# Patient Record
Sex: FEMALE | Race: WHITE | NOT HISPANIC OR LATINO
[De-identification: names, ages, dates, MRNs, and addresses within clinical notes are randomized per-mention and may not be internally consistent; named-entity substitution may affect disease eponyms.]

---
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
## 2023-01-03 ENCOUNTER — APPOINTMENT (OUTPATIENT)
Dept: PEDIATRIC ALLERGY IMMUNOLOGY | Facility: CLINIC | Age: 18
End: 2023-01-03
Payer: COMMERCIAL

## 2023-01-03 VITALS
DIASTOLIC BLOOD PRESSURE: 75 MMHG | SYSTOLIC BLOOD PRESSURE: 122 MMHG | RESPIRATION RATE: 16 BRPM | HEART RATE: 71 BPM | TEMPERATURE: 98.3 F | OXYGEN SATURATION: 96 %

## 2023-01-03 PROCEDURE — 99072 ADDL SUPL MATRL&STAF TM PHE: CPT

## 2023-01-03 PROCEDURE — 96401 CHEMO ANTI-NEOPL SQ/IM: CPT | Mod: 76

## 2023-01-03 RX ORDER — OMALIZUMAB 150 MG/ML
150 INJECTION, SOLUTION SUBCUTANEOUS
Qty: 0 | Refills: 0 | Status: COMPLETED | OUTPATIENT
Start: 2023-01-03

## 2023-01-03 RX ADMIN — OMALIZUMAB 0 MG/ML: 150 INJECTION, SOLUTION SUBCUTANEOUS at 00:00

## 2023-01-31 ENCOUNTER — APPOINTMENT (OUTPATIENT)
Dept: PEDIATRIC ALLERGY IMMUNOLOGY | Facility: CLINIC | Age: 18
End: 2023-01-31
Payer: COMMERCIAL

## 2023-01-31 VITALS
DIASTOLIC BLOOD PRESSURE: 76 MMHG | SYSTOLIC BLOOD PRESSURE: 112 MMHG | TEMPERATURE: 98.3 F | OXYGEN SATURATION: 96 % | HEART RATE: 74 BPM | RESPIRATION RATE: 16 BRPM

## 2023-01-31 PROCEDURE — 96401 CHEMO ANTI-NEOPL SQ/IM: CPT | Mod: 76

## 2023-01-31 PROCEDURE — 99072 ADDL SUPL MATRL&STAF TM PHE: CPT

## 2023-01-31 RX ORDER — OMALIZUMAB 150 MG/ML
150 INJECTION, SOLUTION SUBCUTANEOUS
Qty: 0 | Refills: 0 | Status: COMPLETED | OUTPATIENT
Start: 2023-01-31

## 2023-01-31 RX ADMIN — OMALIZUMAB 0 MG/ML: 150 INJECTION, SOLUTION SUBCUTANEOUS at 00:00

## 2023-03-07 ENCOUNTER — APPOINTMENT (OUTPATIENT)
Dept: PEDIATRIC ALLERGY IMMUNOLOGY | Facility: CLINIC | Age: 18
End: 2023-03-07
Payer: COMMERCIAL

## 2023-03-07 VITALS
HEART RATE: 70 BPM | SYSTOLIC BLOOD PRESSURE: 125 MMHG | OXYGEN SATURATION: 97 % | DIASTOLIC BLOOD PRESSURE: 76 MMHG | RESPIRATION RATE: 16 BRPM

## 2023-03-07 PROCEDURE — 99072 ADDL SUPL MATRL&STAF TM PHE: CPT

## 2023-03-07 PROCEDURE — 96401 CHEMO ANTI-NEOPL SQ/IM: CPT | Mod: 76

## 2023-03-07 RX ORDER — OMALIZUMAB 150 MG/ML
150 INJECTION, SOLUTION SUBCUTANEOUS
Qty: 0 | Refills: 0 | Status: COMPLETED | OUTPATIENT
Start: 2023-03-07

## 2023-04-04 ENCOUNTER — APPOINTMENT (OUTPATIENT)
Dept: PEDIATRIC ALLERGY IMMUNOLOGY | Facility: CLINIC | Age: 18
End: 2023-04-04

## 2023-05-04 ENCOUNTER — APPOINTMENT (OUTPATIENT)
Dept: PEDIATRIC ALLERGY IMMUNOLOGY | Facility: CLINIC | Age: 18
End: 2023-05-04
Payer: COMMERCIAL

## 2023-05-04 VITALS
RESPIRATION RATE: 20 BRPM | HEIGHT: 65 IN | BODY MASS INDEX: 39.99 KG/M2 | OXYGEN SATURATION: 97 % | DIASTOLIC BLOOD PRESSURE: 69 MMHG | WEIGHT: 240 LBS | SYSTOLIC BLOOD PRESSURE: 110 MMHG | HEART RATE: 71 BPM

## 2023-05-04 PROCEDURE — 99214 OFFICE O/P EST MOD 30 MIN: CPT

## 2023-05-04 RX ORDER — PREDNISONE 10 MG/1
10 TABLET ORAL
Qty: 26 | Refills: 0 | Status: DISCONTINUED | COMMUNITY
Start: 2022-09-06 | End: 2023-05-04

## 2023-05-04 RX ORDER — CLARITHROMYCIN 500 MG/1
500 TABLET, FILM COATED ORAL
Qty: 20 | Refills: 0 | Status: DISCONTINUED | COMMUNITY
Start: 2022-09-19 | End: 2023-05-04

## 2023-05-04 RX ORDER — DIPHENHYDRAMINE HCL 12.5MG/5ML
12.5 LIQUID (ML) ORAL
Refills: 0 | Status: ACTIVE | COMMUNITY

## 2023-05-04 RX ORDER — METFORMIN HYDROCHLORIDE 500 MG/1
500 TABLET, COATED ORAL
Refills: 0 | Status: DISCONTINUED | COMMUNITY
End: 2023-05-04

## 2023-05-04 RX ORDER — OMALIZUMAB 150 MG/ML
150 INJECTION, SOLUTION SUBCUTANEOUS
Refills: 0 | Status: DISCONTINUED | COMMUNITY
End: 2023-05-04

## 2023-05-04 NOTE — REASON FOR VISIT
[Routine Follow-Up] : a routine follow-up visit for [Allergy Evaluation/ Skin Testing] : allergy evaluation and or skin testing [Asthma] : asthma [Hives] : hives [FreeTextEntry3] : with dogs

## 2023-05-04 NOTE — ASSESSMENT
[FreeTextEntry1] : 17 y.o with hx of CIU, AR, and Asthma presents with hives after exposure to dog saliva\par \par Discussed with patient efficacy of IT with dog for purpose of contact urticaria - ?? helpful - \par \par Suggest:\par -At this point we will hold off on shots and continue Allegra 180mg 2 tab QD with 3rd tab as needed\par -Continue Xolair 300mg q4 weeks\par - Continue Albuterol 2 puffs q4-6hrs PRN\par - Avoid PN, pecan,walnut and lentil and Keep epinephrine Autoinjector on hand\par \par Total MD time spent on this encounter was 35 minutes.  This includes time devoted to preparing to see the patient with review of previous medical record, obtaining medical history, performing physical exam, counseling and patient education with patient and family, ordering medications and lab studies, documentation in the medical record and coordination of care.\par \par \par

## 2023-05-04 NOTE — SOCIAL HISTORY
[House] : [unfilled] lives in a house  [Radiator/Baseboard] : heating provided by radiator(s)/baseboard(s) [Window Units] : air conditioning provided by window units [None] : none [Single] : single [Mother] : mother [Father] : father [Sister] : sister [College] : College [Humidifier] : does not use a humidifier [Dehumidifier] : does not use a dehumidifier [Dust Mite Covers] : does not have dust mite covers [Feather Pillows] : does not have feather pillows [Feather Comforter] : does not have a feather comforter [Bedroom] : not in the bedroom [Living Area] : not in the living area [Smokers in Household] : there are no smokers in the home [de-identified] : music [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

## 2023-05-04 NOTE — HISTORY OF PRESENT ILLNESS
[de-identified] : 17 yr old with 3 year history of cold urticaria on Xolair 300 mg SQ now q 6 weeks. AFter about 3 years of Xolair pt fees hives have improved about 40-50% and pt had her Xolair tapered to q 6 weeks in 1/22 and feels she is doing better with cold urticaria. She tolerated cold air and cold water much better and has no hives at other times. Her insurance makes Xolair coverage difficult and she is received product free of charge directly from [employer]. She continues her Allegra 180 mg qd and is happy with how she is doing. \par \par She has underlying peanut and lentil reactions but has also been avoiding all tree nuts for ?? reasons. \par \par Her last set of Immunocaps 7/26/22\par PN 3.29 down from 8.90 with riddhi h2 2.19 down from 5.55-Avoid\par [place], brazil nut, cashew, coconut, hazelnut macadamia, pine nut, green bean, pea, red kidney bean, soy, whitebean -still negative- ok to try at home\par Pecan and walnut down but still too large so must avoid\par Pistachio- 0.39 down from 0.68-ok to try at home\par Lentil not done-avoid\par \par She would like to eat TN but would like ST first before doing at home. She will continue to avoid walnut and pecan and PN secondary to large Immunocaps noted above but would like to eat other TN. She is OK with white beans, black beans, chickpeas but would like to consider eating soy and peas. \par \par Patient now returns 5/23 - interested in getting AIT for the hives she gets with exposure to dog saliva. 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 When dog lick her she gets hives that are itchy and last about 2 hours. She typically pre-treats prior to babysitting with 2 Allegra 180mg but hives still occur. The use of Allegra does help prevent any nasal or asthma symptoms with dog exposure. She would like to use AIT to prevent hives with dog exposure. Patient on AIT from 1/2013-2/2015 and was inconsistent with large local reactions and two systemic reactions.\par \par Overall her hives have been controlled with use of Xolair 300mg q4 weeks and Allegra PRN. Her allergies have also been well controlled with no symptoms this spring. Her asthma has also been well controlled with minimal use of albuterol and no further oral steroid use. \par \par Since last visit pt has been able to introduce almonds, hazelnut, coconut, and pistachio. She continues to avoid PN, pecan,walnut and lentil and carries EpiPen.\par \par Pt had previously been on IT years ago and was non complaints and had significant problems with moderate local reaction and 1-2 systemic reactions IT was stopped\par

## 2023-05-08 ENCOUNTER — APPOINTMENT (OUTPATIENT)
Dept: PEDIATRIC ALLERGY IMMUNOLOGY | Facility: CLINIC | Age: 18
End: 2023-05-08

## 2023-06-29 ENCOUNTER — APPOINTMENT (OUTPATIENT)
Dept: PEDIATRIC ALLERGY IMMUNOLOGY | Facility: CLINIC | Age: 18
End: 2023-06-29
Payer: COMMERCIAL

## 2023-06-29 VITALS
SYSTOLIC BLOOD PRESSURE: 116 MMHG | OXYGEN SATURATION: 98 % | DIASTOLIC BLOOD PRESSURE: 72 MMHG | HEART RATE: 68 BPM | RESPIRATION RATE: 20 BRPM

## 2023-06-29 PROCEDURE — 96401 CHEMO ANTI-NEOPL SQ/IM: CPT | Mod: 76

## 2023-06-29 RX ORDER — OMALIZUMAB 150 MG/ML
150 INJECTION, SOLUTION SUBCUTANEOUS
Qty: 0 | Refills: 0 | Status: COMPLETED | OUTPATIENT
Start: 2023-06-29

## 2023-06-29 RX ORDER — EPINEPHRINE 0.3 MG/.3ML
0.3 INJECTION INTRAMUSCULAR
Qty: 2 | Refills: 1 | Status: ACTIVE | COMMUNITY
Start: 1900-01-01 | End: 1900-01-01

## 2023-06-29 RX ORDER — ALBUTEROL SULFATE 90 UG/1
108 (90 BASE) INHALANT RESPIRATORY (INHALATION)
Qty: 1 | Refills: 1 | Status: ACTIVE | COMMUNITY
Start: 2022-09-06 | End: 1900-01-01

## 2023-06-29 RX ADMIN — OMALIZUMAB 0 MG/ML: 150 INJECTION, SOLUTION SUBCUTANEOUS at 00:00

## 2023-07-27 ENCOUNTER — NON-APPOINTMENT (OUTPATIENT)
Age: 18
End: 2023-07-27

## 2023-08-10 ENCOUNTER — APPOINTMENT (OUTPATIENT)
Dept: PEDIATRIC ALLERGY IMMUNOLOGY | Facility: CLINIC | Age: 18
End: 2023-08-10
Payer: COMMERCIAL

## 2023-08-10 VITALS
RESPIRATION RATE: 24 BRPM | HEART RATE: 70 BPM | SYSTOLIC BLOOD PRESSURE: 110 MMHG | OXYGEN SATURATION: 100 % | DIASTOLIC BLOOD PRESSURE: 72 MMHG

## 2023-08-10 PROCEDURE — 96401 CHEMO ANTI-NEOPL SQ/IM: CPT | Mod: 76

## 2023-08-10 RX ORDER — OMALIZUMAB 150 MG/ML
150 INJECTION, SOLUTION SUBCUTANEOUS
Qty: 0 | Refills: 0 | Status: COMPLETED | OUTPATIENT
Start: 2023-08-10

## 2023-09-12 ENCOUNTER — APPOINTMENT (OUTPATIENT)
Dept: PEDIATRIC ALLERGY IMMUNOLOGY | Facility: CLINIC | Age: 18
End: 2023-09-12
Payer: COMMERCIAL

## 2023-09-12 PROCEDURE — 96401U: CUSTOM | Mod: NC

## 2023-09-12 PROCEDURE — 96372 THER/PROPH/DIAG INJ SC/IM: CPT | Mod: NC

## 2023-09-12 RX ORDER — OMALIZUMAB 150 MG/ML
150 INJECTION, SOLUTION SUBCUTANEOUS
Qty: 0 | Refills: 0 | Status: COMPLETED | OUTPATIENT
Start: 2023-09-12

## 2023-09-26 ENCOUNTER — RX RENEWAL (OUTPATIENT)
Age: 18
End: 2023-09-26

## 2023-09-26 RX ORDER — OMALIZUMAB 150 MG/ML
150 INJECTION, SOLUTION SUBCUTANEOUS
Qty: 300 | Refills: 6 | Status: DISCONTINUED | COMMUNITY
Start: 2022-11-29 | End: 2023-09-26

## 2023-10-10 ENCOUNTER — APPOINTMENT (OUTPATIENT)
Dept: PEDIATRIC ALLERGY IMMUNOLOGY | Facility: CLINIC | Age: 18
End: 2023-10-10

## 2023-11-21 ENCOUNTER — APPOINTMENT (OUTPATIENT)
Dept: PEDIATRIC ALLERGY IMMUNOLOGY | Facility: CLINIC | Age: 18
End: 2023-11-21
Payer: COMMERCIAL

## 2023-11-21 VITALS
BODY MASS INDEX: 40.82 KG/M2 | DIASTOLIC BLOOD PRESSURE: 82 MMHG | RESPIRATION RATE: 20 BRPM | OXYGEN SATURATION: 97 % | WEIGHT: 245 LBS | SYSTOLIC BLOOD PRESSURE: 121 MMHG | HEIGHT: 65 IN | HEART RATE: 67 BPM

## 2023-11-21 DIAGNOSIS — J30.9 ALLERGIC RHINITIS, UNSPECIFIED: ICD-10-CM

## 2023-11-21 DIAGNOSIS — J45.20 MILD INTERMITTENT ASTHMA, UNCOMPLICATED: ICD-10-CM

## 2023-11-21 DIAGNOSIS — Z91.018 ALLERGY TO OTHER FOODS: ICD-10-CM

## 2023-11-21 DIAGNOSIS — Z91.010 ALLERGY TO PEANUTS: ICD-10-CM

## 2023-11-21 PROCEDURE — 99214 OFFICE O/P EST MOD 30 MIN: CPT | Mod: 25

## 2023-11-21 PROCEDURE — 96372 THER/PROPH/DIAG INJ SC/IM: CPT

## 2023-11-21 RX ORDER — PREDNISONE 20 MG/1
20 TABLET ORAL DAILY
Qty: 20 | Refills: 0 | Status: DISCONTINUED | COMMUNITY
Start: 2023-07-27 | End: 2023-11-21

## 2023-11-21 RX ORDER — CETIRIZINE HYDROCHLORIDE 10 MG/1
10 TABLET, FILM COATED ORAL
Refills: 0 | Status: ACTIVE | COMMUNITY

## 2023-11-21 RX ORDER — OMALIZUMAB 150 MG/ML
150 INJECTION, SOLUTION SUBCUTANEOUS
Qty: 0 | Refills: 0 | Status: COMPLETED | OUTPATIENT
Start: 2023-11-21

## 2024-01-02 ENCOUNTER — APPOINTMENT (OUTPATIENT)
Dept: PEDIATRIC ALLERGY IMMUNOLOGY | Facility: CLINIC | Age: 19
End: 2024-01-02
Payer: COMMERCIAL

## 2024-01-02 VITALS
SYSTOLIC BLOOD PRESSURE: 114 MMHG | HEART RATE: 73 BPM | RESPIRATION RATE: 18 BRPM | DIASTOLIC BLOOD PRESSURE: 74 MMHG | OXYGEN SATURATION: 97 %

## 2024-01-02 PROCEDURE — 96401 CHEMO ANTI-NEOPL SQ/IM: CPT | Mod: 76

## 2024-01-02 RX ORDER — OMALIZUMAB 150 MG/ML
150 INJECTION, SOLUTION SUBCUTANEOUS
Qty: 0 | Refills: 0 | Status: COMPLETED | OUTPATIENT
Start: 2024-01-02

## 2024-01-03 RX ORDER — OMALIZUMAB 150 MG/ML
150 INJECTION, SOLUTION SUBCUTANEOUS
Qty: 2 | Refills: 7 | Status: ACTIVE | COMMUNITY
Start: 1900-01-01 | End: 1900-01-01

## 2024-01-10 ENCOUNTER — NON-APPOINTMENT (OUTPATIENT)
Age: 19
End: 2024-01-10

## 2024-02-15 ENCOUNTER — APPOINTMENT (OUTPATIENT)
Dept: PEDIATRIC ALLERGY IMMUNOLOGY | Facility: CLINIC | Age: 19
End: 2024-02-15
Payer: COMMERCIAL

## 2024-02-15 VITALS
HEART RATE: 71 BPM | DIASTOLIC BLOOD PRESSURE: 63 MMHG | SYSTOLIC BLOOD PRESSURE: 126 MMHG | OXYGEN SATURATION: 98 % | RESPIRATION RATE: 18 BRPM

## 2024-02-15 PROCEDURE — 96401 CHEMO ANTI-NEOPL SQ/IM: CPT | Mod: 76

## 2024-02-15 RX ORDER — OMALIZUMAB 150 MG/ML
150 INJECTION, SOLUTION SUBCUTANEOUS
Qty: 0 | Refills: 0 | Status: COMPLETED | OUTPATIENT
Start: 2024-02-15

## 2024-03-14 ENCOUNTER — APPOINTMENT (OUTPATIENT)
Dept: PEDIATRIC ALLERGY IMMUNOLOGY | Facility: CLINIC | Age: 19
End: 2024-03-14
Payer: COMMERCIAL

## 2024-03-14 PROCEDURE — 96401 CHEMO ANTI-NEOPL SQ/IM: CPT | Mod: 76

## 2024-03-14 RX ORDER — OMALIZUMAB 150 MG/ML
150 INJECTION, SOLUTION SUBCUTANEOUS
Qty: 0 | Refills: 0 | Status: COMPLETED | OUTPATIENT
Start: 2024-03-14

## 2024-03-19 ENCOUNTER — NON-APPOINTMENT (OUTPATIENT)
Age: 19
End: 2024-03-19

## 2024-04-18 RX ORDER — EPINEPHRINE 0.3 MG/.3ML
0.3 INJECTION INTRAMUSCULAR
Qty: 2 | Refills: 1 | Status: ACTIVE | COMMUNITY
Start: 2023-11-21 | End: 1900-01-01

## 2024-04-18 RX ORDER — ALBUTEROL SULFATE 90 UG/1
108 (90 BASE) INHALANT RESPIRATORY (INHALATION)
Qty: 1 | Refills: 3 | Status: ACTIVE | COMMUNITY
Start: 2023-11-21 | End: 1900-01-01

## 2024-04-23 ENCOUNTER — APPOINTMENT (OUTPATIENT)
Dept: PEDIATRIC ALLERGY IMMUNOLOGY | Facility: CLINIC | Age: 19
End: 2024-04-23
Payer: COMMERCIAL

## 2024-04-23 PROCEDURE — 96401 CHEMO ANTI-NEOPL SQ/IM: CPT | Mod: 76

## 2024-04-23 RX ORDER — OMALIZUMAB 150 MG/ML
150 INJECTION, SOLUTION SUBCUTANEOUS
Qty: 0 | Refills: 0 | Status: COMPLETED | OUTPATIENT
Start: 2024-04-23

## 2024-04-23 RX ADMIN — OMALIZUMAB 300 MG/ML: 150 INJECTION, SOLUTION SUBCUTANEOUS at 00:00

## 2024-05-02 ENCOUNTER — APPOINTMENT (OUTPATIENT)
Dept: PEDIATRIC ALLERGY IMMUNOLOGY | Facility: CLINIC | Age: 19
End: 2024-05-02

## 2024-05-21 ENCOUNTER — APPOINTMENT (OUTPATIENT)
Dept: PEDIATRIC ALLERGY IMMUNOLOGY | Facility: CLINIC | Age: 19
End: 2024-05-21
Payer: COMMERCIAL

## 2024-05-21 VITALS
OXYGEN SATURATION: 97 % | HEART RATE: 90 BPM | RESPIRATION RATE: 18 BRPM | DIASTOLIC BLOOD PRESSURE: 81 MMHG | SYSTOLIC BLOOD PRESSURE: 114 MMHG

## 2024-05-21 PROCEDURE — 96401 CHEMO ANTI-NEOPL SQ/IM: CPT | Mod: 76

## 2024-05-21 RX ORDER — OMALIZUMAB 150 MG/ML
150 INJECTION, SOLUTION SUBCUTANEOUS
Qty: 0 | Refills: 0 | Status: COMPLETED | OUTPATIENT
Start: 2024-05-21

## 2024-05-21 RX ADMIN — OMALIZUMAB 300 MG/ML: 150 INJECTION, SOLUTION SUBCUTANEOUS at 00:00

## 2024-06-27 ENCOUNTER — APPOINTMENT (OUTPATIENT)
Dept: PEDIATRIC ALLERGY IMMUNOLOGY | Facility: CLINIC | Age: 19
End: 2024-06-27
Payer: COMMERCIAL

## 2024-06-27 VITALS — HEART RATE: 73 BPM | SYSTOLIC BLOOD PRESSURE: 136 MMHG | OXYGEN SATURATION: 97 % | DIASTOLIC BLOOD PRESSURE: 80 MMHG

## 2024-06-27 DIAGNOSIS — L50.2 URTICARIA DUE TO COLD AND HEAT: ICD-10-CM

## 2024-06-27 PROCEDURE — 96401 CHEMO ANTI-NEOPL SQ/IM: CPT | Mod: 76

## 2024-08-01 ENCOUNTER — APPOINTMENT (OUTPATIENT)
Dept: PEDIATRIC ALLERGY IMMUNOLOGY | Facility: CLINIC | Age: 19
End: 2024-08-01
Payer: COMMERCIAL

## 2024-08-01 VITALS
RESPIRATION RATE: 20 BRPM | DIASTOLIC BLOOD PRESSURE: 79 MMHG | OXYGEN SATURATION: 96 % | HEART RATE: 71 BPM | SYSTOLIC BLOOD PRESSURE: 126 MMHG

## 2024-08-01 DIAGNOSIS — L50.2 URTICARIA DUE TO COLD AND HEAT: ICD-10-CM

## 2024-08-01 PROCEDURE — 96401 CHEMO ANTI-NEOPL SQ/IM: CPT | Mod: 76

## 2024-08-01 RX ORDER — OMALIZUMAB 150 MG/ML
150 INJECTION, SOLUTION SUBCUTANEOUS
Qty: 0 | Refills: 0 | Status: COMPLETED | OUTPATIENT
Start: 2024-08-01

## 2024-09-05 ENCOUNTER — APPOINTMENT (OUTPATIENT)
Dept: PEDIATRIC ALLERGY IMMUNOLOGY | Facility: CLINIC | Age: 19
End: 2024-09-05
Payer: COMMERCIAL

## 2024-09-05 ENCOUNTER — APPOINTMENT (OUTPATIENT)
Dept: PEDIATRIC ALLERGY IMMUNOLOGY | Facility: CLINIC | Age: 19
End: 2024-09-05

## 2024-09-05 VITALS
HEART RATE: 79 BPM | HEIGHT: 65 IN | SYSTOLIC BLOOD PRESSURE: 107 MMHG | WEIGHT: 248 LBS | BODY MASS INDEX: 41.32 KG/M2 | DIASTOLIC BLOOD PRESSURE: 71 MMHG | RESPIRATION RATE: 20 BRPM | OXYGEN SATURATION: 97 %

## 2024-09-05 DIAGNOSIS — L50.2 URTICARIA DUE TO COLD AND HEAT: ICD-10-CM

## 2024-09-05 DIAGNOSIS — L50.8 OTHER URTICARIA: ICD-10-CM

## 2024-09-05 DIAGNOSIS — J30.9 ALLERGIC RHINITIS, UNSPECIFIED: ICD-10-CM

## 2024-09-05 DIAGNOSIS — J45.20 MILD INTERMITTENT ASTHMA, UNCOMPLICATED: ICD-10-CM

## 2024-09-05 DIAGNOSIS — Z91.018 ALLERGY TO OTHER FOODS: ICD-10-CM

## 2024-09-05 DIAGNOSIS — Z91.010 ALLERGY TO PEANUTS: ICD-10-CM

## 2024-09-05 PROCEDURE — 96401 CHEMO ANTI-NEOPL SQ/IM: CPT | Mod: 76

## 2024-09-05 PROCEDURE — 99214 OFFICE O/P EST MOD 30 MIN: CPT | Mod: 25

## 2024-09-05 PROCEDURE — XXXXX: CPT | Mod: 1L

## 2024-09-05 RX ORDER — OMALIZUMAB 150 MG/ML
150 INJECTION, SOLUTION SUBCUTANEOUS
Qty: 0 | Refills: 0 | Status: COMPLETED | OUTPATIENT
Start: 2024-09-05

## 2024-09-05 NOTE — ASSESSMENT
[FreeTextEntry1] : 18 yr old with long history of cold induced urticaria and chronic idiopathic urticaria - now doing very well on Xolair - currently tapering dosages to q6-8 weeks to see how she does. Currently on no H1 blockers but at this time suggest starting Zyrtec 10 mg qd-bid as we head into colder weather of fall and winter  Will try for insurance authorization again  Will repeat PN and TN immunocaps Will continue to carry Epi Pen Discuss use of Xolair for food allergies and its benefit in PN allergy  Follow up six months  Total MD time spent on this encounter was 35 minutes.  This includes time devoted to preparing to see the patient with review of previous medical record, obtaining medical history, performing physical exam, counseling and patient education with patient and family, ordering medications and lab studies, documentation in the medical record and coordination of care.

## 2024-09-05 NOTE — REVIEW OF SYSTEMS
[Rhinorrhea] : rhinorrhea [Post Nasal Drip] : post nasal drip [Urticaria] : urticaria [Pruritus] : pruritus [Nl] : Respiratory

## 2024-09-05 NOTE — HISTORY OF PRESENT ILLNESS
[de-identified] : 18 yr old with 4 year history of cold urticaria on Xolair 300 mg SQ now q 6 weeks. After about 3 years of Xolair pt fees hives have improved about 80-90% - taking quite some time to go away. Pt now tolerated most exposure to cold is able to swim in heated pools and go in and out of warm showers.  This is likely related to her chronic Xolair use which has helped significantly and has bene life changing for her.  She also had urticaria that was not cold related before, chronic idiopathic urticaria and that is now gone as well  She has not needed her Zyrtec or Xyzal recently.  However she has had some insurance issues with coverage and we are tapering her Xolair to q 8 weeks as a temporary measure until authorization is obtained  Pt also has mild AR and mild intermittent asthma  -well controlled   She has underlying peanut and lentil reactions but has also been avoiding some tree nuts, - she is OK with almond, hazelnut, coconut and ?? pistachio - she avoids cashew, pecan and walnut secondary to previous large positive tests, She also avoids lentils all tree nuts for ?? reasons.   Her last set of Immunocaps 7/26/22 PN 3.29 down from 8.90 with riddhi h2 2.19 down from 5.55-Avoid [place], [place] nut, cashew, coconut, hazelnut macadamia, pine nut, green bean, pea, red kidney bean, soy, white bean -still negative- ok to try at home-  Pecan and walnut down but still too large so must avoid P[place]achio- 0.39 down from 0.68-ok to try at home Lentil not done-avoid   carries Epi Pen  she would like to re-check her numbers to PN  and TN Discuss use of her Xolair for reduction in PN allergy   Pt is a nursing aid and a nursing student

## 2024-09-05 NOTE — SOCIAL HISTORY
[House] : [unfilled] lives in a house  [Radiator/Baseboard] : heating provided by radiator(s)/baseboard(s) [Window Units] : air conditioning provided by window units [None] : none [Single] : single [Mother] : mother [Father] : father [Sister] : sister [College] : College [Humidifier] : does not use a humidifier [Dehumidifier] : does not use a dehumidifier [Dust Mite Covers] : does not have dust mite covers [Feather Pillows] : does not have feather pillows [Feather Comforter] : does not have a feather comforter [Bedroom] : not in the bedroom [Living Area] : not in the living area [Smokers in Household] : there are no smokers in the home [de-identified] : music [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

## 2024-10-31 ENCOUNTER — APPOINTMENT (OUTPATIENT)
Dept: PEDIATRIC ALLERGY IMMUNOLOGY | Facility: CLINIC | Age: 19
End: 2024-10-31

## 2024-11-26 ENCOUNTER — APPOINTMENT (OUTPATIENT)
Dept: PEDIATRIC ALLERGY IMMUNOLOGY | Facility: CLINIC | Age: 19
End: 2024-11-26
Payer: COMMERCIAL

## 2024-11-26 VITALS
OXYGEN SATURATION: 98 % | RESPIRATION RATE: 18 BRPM | HEART RATE: 68 BPM | DIASTOLIC BLOOD PRESSURE: 63 MMHG | SYSTOLIC BLOOD PRESSURE: 107 MMHG

## 2024-11-26 DIAGNOSIS — L50.2 URTICARIA DUE TO COLD AND HEAT: ICD-10-CM

## 2024-11-26 DIAGNOSIS — L50.8 OTHER URTICARIA: ICD-10-CM

## 2024-11-26 PROCEDURE — 96401 CHEMO ANTI-NEOPL SQ/IM: CPT | Mod: 76

## 2024-11-26 RX ORDER — OMALIZUMAB 300 MG/2ML
300 INJECTION, SOLUTION SUBCUTANEOUS
Qty: 0 | Refills: 0 | Status: COMPLETED | OUTPATIENT
Start: 2024-11-26

## 2024-11-26 RX ADMIN — OMALIZUMAB 300 MG/2ML: 300 INJECTION, SOLUTION SUBCUTANEOUS at 00:00

## 2024-11-27 RX ORDER — OMALIZUMAB 300 MG/2ML
300 INJECTION, SOLUTION SUBCUTANEOUS
Qty: 0 | Refills: 0 | Status: COMPLETED | OUTPATIENT
Start: 2024-11-26

## 2024-12-31 ENCOUNTER — APPOINTMENT (OUTPATIENT)
Dept: PEDIATRIC ALLERGY IMMUNOLOGY | Facility: CLINIC | Age: 19
End: 2024-12-31
Payer: COMMERCIAL

## 2024-12-31 VITALS
RESPIRATION RATE: 20 BRPM | OXYGEN SATURATION: 97 % | SYSTOLIC BLOOD PRESSURE: 101 MMHG | HEART RATE: 89 BPM | DIASTOLIC BLOOD PRESSURE: 70 MMHG

## 2024-12-31 DIAGNOSIS — L50.2 URTICARIA DUE TO COLD AND HEAT: ICD-10-CM

## 2024-12-31 PROCEDURE — 96401 CHEMO ANTI-NEOPL SQ/IM: CPT | Mod: 76

## 2024-12-31 PROCEDURE — XXXXX: CPT

## 2024-12-31 RX ORDER — OMALIZUMAB 300 MG/2ML
300 INJECTION, SOLUTION SUBCUTANEOUS
Qty: 0 | Refills: 0 | Status: COMPLETED | OUTPATIENT
Start: 2024-12-31

## 2024-12-31 RX ADMIN — OMALIZUMAB 300 MG/2ML: 300 INJECTION, SOLUTION SUBCUTANEOUS at 00:00

## 2025-01-28 ENCOUNTER — TRANSCRIPTION ENCOUNTER (OUTPATIENT)
Age: 20
End: 2025-01-28

## 2025-01-28 ENCOUNTER — APPOINTMENT (OUTPATIENT)
Dept: PEDIATRIC ALLERGY IMMUNOLOGY | Facility: CLINIC | Age: 20
End: 2025-01-28
Payer: COMMERCIAL

## 2025-01-28 VITALS
HEART RATE: 90 BPM | RESPIRATION RATE: 18 BRPM | SYSTOLIC BLOOD PRESSURE: 122 MMHG | OXYGEN SATURATION: 97 % | DIASTOLIC BLOOD PRESSURE: 75 MMHG

## 2025-01-28 DIAGNOSIS — L50.2 URTICARIA DUE TO COLD AND HEAT: ICD-10-CM

## 2025-01-28 PROCEDURE — XXXXX: CPT | Mod: 1L

## 2025-01-28 PROCEDURE — 96401 CHEMO ANTI-NEOPL SQ/IM: CPT

## 2025-01-28 RX ORDER — OMALIZUMAB 300 MG/2ML
300 INJECTION, SOLUTION SUBCUTANEOUS
Qty: 0 | Refills: 0 | Status: COMPLETED | OUTPATIENT
Start: 2025-01-28

## 2025-01-28 RX ADMIN — OMALIZUMAB 300 MG/2ML: 300 INJECTION, SOLUTION SUBCUTANEOUS at 00:00

## 2025-02-24 NOTE — ED PEDIATRIC NURSE NOTE - NS_NURSE_RECEIVING_PHYS_ED_ALL_ED_FT
Detail Level: Simple Additional Notes: This was a shave removal not a biopsy by shave Render Risk Assessment In Note?: no Dr. Casarez

## 2025-03-04 ENCOUNTER — APPOINTMENT (OUTPATIENT)
Dept: PEDIATRIC ALLERGY IMMUNOLOGY | Facility: CLINIC | Age: 20
End: 2025-03-04
Payer: COMMERCIAL

## 2025-03-04 VITALS
WEIGHT: 256 LBS | DIASTOLIC BLOOD PRESSURE: 84 MMHG | HEIGHT: 62 IN | SYSTOLIC BLOOD PRESSURE: 141 MMHG | BODY MASS INDEX: 47.11 KG/M2

## 2025-03-04 DIAGNOSIS — J45.20 MILD INTERMITTENT ASTHMA, UNCOMPLICATED: ICD-10-CM

## 2025-03-04 DIAGNOSIS — L50.8 OTHER URTICARIA: ICD-10-CM

## 2025-03-04 DIAGNOSIS — Z91.018 ALLERGY TO OTHER FOODS: ICD-10-CM

## 2025-03-04 DIAGNOSIS — J30.9 ALLERGIC RHINITIS, UNSPECIFIED: ICD-10-CM

## 2025-03-04 PROCEDURE — 96401 CHEMO ANTI-NEOPL SQ/IM: CPT

## 2025-03-04 PROCEDURE — 99214 OFFICE O/P EST MOD 30 MIN: CPT | Mod: 25

## 2025-03-04 RX ORDER — OMALIZUMAB 300 MG/2ML
300 INJECTION, SOLUTION SUBCUTANEOUS
Qty: 0 | Refills: 0 | Status: COMPLETED | OUTPATIENT
Start: 2025-03-04

## 2025-03-10 ENCOUNTER — APPOINTMENT (OUTPATIENT)
Dept: PEDIATRIC ALLERGY IMMUNOLOGY | Facility: CLINIC | Age: 20
End: 2025-03-10
Payer: COMMERCIAL

## 2025-03-10 DIAGNOSIS — L50.2 URTICARIA DUE TO COLD AND HEAT: ICD-10-CM

## 2025-03-10 PROCEDURE — 99211 OFF/OP EST MAY X REQ PHY/QHP: CPT

## 2025-03-30 ENCOUNTER — EMERGENCY (EMERGENCY)
Facility: HOSPITAL | Age: 20
LOS: 0 days | Discharge: HOME | End: 2025-03-30
Attending: EMERGENCY MEDICINE
Payer: COMMERCIAL

## 2025-03-30 ENCOUNTER — NON-APPOINTMENT (OUTPATIENT)
Age: 20
End: 2025-03-30

## 2025-03-30 VITALS
HEART RATE: 69 BPM | OXYGEN SATURATION: 100 % | TEMPERATURE: 98 F | WEIGHT: 229.94 LBS | RESPIRATION RATE: 18 BRPM | SYSTOLIC BLOOD PRESSURE: 111 MMHG | DIASTOLIC BLOOD PRESSURE: 67 MMHG

## 2025-03-30 VITALS
DIASTOLIC BLOOD PRESSURE: 68 MMHG | RESPIRATION RATE: 16 BRPM | SYSTOLIC BLOOD PRESSURE: 112 MMHG | HEART RATE: 70 BPM | TEMPERATURE: 98 F | OXYGEN SATURATION: 100 %

## 2025-03-30 DIAGNOSIS — Z91.018 ALLERGY TO OTHER FOODS: ICD-10-CM

## 2025-03-30 DIAGNOSIS — R10.11 RIGHT UPPER QUADRANT PAIN: ICD-10-CM

## 2025-03-30 DIAGNOSIS — Z90.89 ACQUIRED ABSENCE OF OTHER ORGANS: Chronic | ICD-10-CM

## 2025-03-30 DIAGNOSIS — Z91.09 OTHER ALLERGY STATUS, OTHER THAN TO DRUGS AND BIOLOGICAL SUBSTANCES: ICD-10-CM

## 2025-03-30 DIAGNOSIS — J45.909 UNSPECIFIED ASTHMA, UNCOMPLICATED: ICD-10-CM

## 2025-03-30 DIAGNOSIS — Z91.010 ALLERGY TO PEANUTS: ICD-10-CM

## 2025-03-30 DIAGNOSIS — Z98.890 OTHER SPECIFIED POSTPROCEDURAL STATES: Chronic | ICD-10-CM

## 2025-03-30 DIAGNOSIS — K76.0 FATTY (CHANGE OF) LIVER, NOT ELSEWHERE CLASSIFIED: ICD-10-CM

## 2025-03-30 LAB
ALBUMIN SERPL ELPH-MCNC: 3.7 G/DL — SIGNIFICANT CHANGE UP (ref 3.3–5)
ALP SERPL-CCNC: 69 U/L — SIGNIFICANT CHANGE UP (ref 40–120)
ALT FLD-CCNC: 21 U/L — SIGNIFICANT CHANGE UP (ref 12–78)
ANION GAP SERPL CALC-SCNC: 4 MMOL/L — LOW (ref 5–17)
APPEARANCE UR: CLEAR — SIGNIFICANT CHANGE UP
AST SERPL-CCNC: 9 U/L — LOW (ref 15–37)
BACTERIA # UR AUTO: ABNORMAL /HPF
BASOPHILS # BLD AUTO: 0.07 K/UL — SIGNIFICANT CHANGE UP (ref 0–0.2)
BASOPHILS NFR BLD AUTO: 0.8 % — SIGNIFICANT CHANGE UP (ref 0–2)
BILIRUB SERPL-MCNC: 0.4 MG/DL — SIGNIFICANT CHANGE UP (ref 0.2–1.2)
BILIRUB UR-MCNC: NEGATIVE — SIGNIFICANT CHANGE UP
BUN SERPL-MCNC: 10 MG/DL — SIGNIFICANT CHANGE UP (ref 7–23)
CALCIUM SERPL-MCNC: 9.8 MG/DL — SIGNIFICANT CHANGE UP (ref 8.5–10.1)
CAST: 0 /LPF — SIGNIFICANT CHANGE UP (ref 0–4)
CHLORIDE SERPL-SCNC: 106 MMOL/L — SIGNIFICANT CHANGE UP (ref 96–108)
CO2 SERPL-SCNC: 27 MMOL/L — SIGNIFICANT CHANGE UP (ref 22–31)
COLOR SPEC: YELLOW — SIGNIFICANT CHANGE UP
CREAT SERPL-MCNC: 0.73 MG/DL — SIGNIFICANT CHANGE UP (ref 0.5–1.3)
DIFF PNL FLD: NEGATIVE — SIGNIFICANT CHANGE UP
EGFR: 120 ML/MIN/1.73M2 — SIGNIFICANT CHANGE UP
EGFR: 120 ML/MIN/1.73M2 — SIGNIFICANT CHANGE UP
EOSINOPHIL # BLD AUTO: 0.17 K/UL — SIGNIFICANT CHANGE UP (ref 0–0.5)
EOSINOPHIL NFR BLD AUTO: 1.8 % — SIGNIFICANT CHANGE UP (ref 0–6)
GLUCOSE SERPL-MCNC: 90 MG/DL — SIGNIFICANT CHANGE UP (ref 70–99)
GLUCOSE UR QL: NEGATIVE MG/DL — SIGNIFICANT CHANGE UP
HCG SERPL-ACNC: <1 MIU/ML — SIGNIFICANT CHANGE UP
HCT VFR BLD CALC: 39.7 % — SIGNIFICANT CHANGE UP (ref 34.5–45)
HGB BLD-MCNC: 13.2 G/DL — SIGNIFICANT CHANGE UP (ref 11.5–15.5)
IMM GRANULOCYTES # BLD AUTO: 0.02 K/UL — SIGNIFICANT CHANGE UP (ref 0–0.07)
IMM GRANULOCYTES NFR BLD AUTO: 0.2 % — SIGNIFICANT CHANGE UP (ref 0–0.9)
KETONES UR-MCNC: NEGATIVE MG/DL — SIGNIFICANT CHANGE UP
LEUKOCYTE ESTERASE UR-ACNC: ABNORMAL
LIDOCAIN IGE QN: 34 U/L — SIGNIFICANT CHANGE UP (ref 13–75)
LYMPHOCYTES # BLD AUTO: 2.51 K/UL — SIGNIFICANT CHANGE UP (ref 1–3.3)
LYMPHOCYTES NFR BLD AUTO: 27.3 % — SIGNIFICANT CHANGE UP (ref 13–44)
MCHC RBC-ENTMCNC: 30.8 PG — SIGNIFICANT CHANGE UP (ref 27–34)
MCHC RBC-ENTMCNC: 33.2 G/DL — SIGNIFICANT CHANGE UP (ref 32–36)
MCV RBC AUTO: 92.5 FL — SIGNIFICANT CHANGE UP (ref 80–100)
MONOCYTES # BLD AUTO: 0.59 K/UL — SIGNIFICANT CHANGE UP (ref 0–0.9)
NEUTROPHILS # BLD AUTO: 5.84 K/UL — SIGNIFICANT CHANGE UP (ref 1.8–7.4)
NEUTROPHILS NFR BLD AUTO: 63.5 % — SIGNIFICANT CHANGE UP (ref 43–77)
NITRITE UR-MCNC: NEGATIVE — SIGNIFICANT CHANGE UP
NRBC # BLD AUTO: 0 K/UL — SIGNIFICANT CHANGE UP (ref 0–0)
NRBC # FLD: 0 K/UL — SIGNIFICANT CHANGE UP (ref 0–0)
PH UR: 6 — SIGNIFICANT CHANGE UP (ref 5–8)
PMV BLD: 10 FL — SIGNIFICANT CHANGE UP (ref 7–13)
POTASSIUM SERPL-MCNC: 3.4 MMOL/L — LOW (ref 3.5–5.3)
POTASSIUM SERPL-SCNC: 3.4 MMOL/L — LOW (ref 3.5–5.3)
PROT SERPL-MCNC: 8.4 GM/DL — HIGH (ref 6–8.3)
PROT UR-MCNC: NEGATIVE MG/DL — SIGNIFICANT CHANGE UP
RBC # BLD: 4.29 M/UL — SIGNIFICANT CHANGE UP (ref 3.8–5.2)
RBC # FLD: 12.8 % — SIGNIFICANT CHANGE UP (ref 10.3–14.5)
RBC CASTS # UR COMP ASSIST: 3 /HPF — SIGNIFICANT CHANGE UP (ref 0–4)
SODIUM SERPL-SCNC: 137 MMOL/L — SIGNIFICANT CHANGE UP (ref 135–145)
SP GR SPEC: 1 — SIGNIFICANT CHANGE UP (ref 1–1.03)
UROBILINOGEN FLD QL: 0.2 MG/DL — SIGNIFICANT CHANGE UP (ref 0.2–1)
WBC # BLD: 9.2 K/UL — SIGNIFICANT CHANGE UP (ref 3.8–10.5)
WBC # FLD AUTO: 9.2 K/UL — SIGNIFICANT CHANGE UP (ref 3.8–10.5)
WBC UR QL: 9 /HPF — HIGH (ref 0–5)

## 2025-03-30 PROCEDURE — 99284 EMERGENCY DEPT VISIT MOD MDM: CPT | Mod: 25

## 2025-03-30 PROCEDURE — 76705 ECHO EXAM OF ABDOMEN: CPT | Mod: 26

## 2025-03-30 PROCEDURE — 84702 CHORIONIC GONADOTROPIN TEST: CPT

## 2025-03-30 PROCEDURE — 85025 COMPLETE CBC W/AUTO DIFF WBC: CPT

## 2025-03-30 PROCEDURE — 87086 URINE CULTURE/COLONY COUNT: CPT

## 2025-03-30 PROCEDURE — 76705 ECHO EXAM OF ABDOMEN: CPT

## 2025-03-30 PROCEDURE — 96374 THER/PROPH/DIAG INJ IV PUSH: CPT

## 2025-03-30 PROCEDURE — 99284 EMERGENCY DEPT VISIT MOD MDM: CPT

## 2025-03-30 PROCEDURE — 36415 COLL VENOUS BLD VENIPUNCTURE: CPT

## 2025-03-30 PROCEDURE — 83690 ASSAY OF LIPASE: CPT

## 2025-03-30 PROCEDURE — 80053 COMPREHEN METABOLIC PANEL: CPT

## 2025-03-30 PROCEDURE — 81001 URINALYSIS AUTO W/SCOPE: CPT

## 2025-03-30 RX ORDER — KETOROLAC TROMETHAMINE 30 MG/ML
15 INJECTION, SOLUTION INTRAMUSCULAR; INTRAVENOUS ONCE
Refills: 0 | Status: DISCONTINUED | OUTPATIENT
Start: 2025-03-30 | End: 2025-03-30

## 2025-03-30 RX ADMIN — KETOROLAC TROMETHAMINE 15 MILLIGRAM(S): 30 INJECTION, SOLUTION INTRAMUSCULAR; INTRAVENOUS at 18:17

## 2025-03-30 NOTE — ED ADULT TRIAGE NOTE - GLASGOW COMA SCALE: BEST MOTOR RESPONSE, MLM
Pharmacy updated on PA approval for brand name Xenical through patient's Medicaid insurance. Patient updated on PA approval. Care teams updated.    (M6) obeys commands

## 2025-03-30 NOTE — ED STATDOCS - GASTROINTESTINAL, MLM
abdomen soft, mild RUQ ttp, no r/g, no lower abd tenderness, and non-distended. Bowel sounds present.

## 2025-03-30 NOTE — ED ADULT TRIAGE NOTE - GLASGOW COMA SCALE: EYE OPENING, MLM
JENELLE  GROUP DOCUMENTATION INDIVIDUAL Group Therapy Note Date: 10/16/2020 Group Start Time: 1100 Group End Time: 1200 Group Topic: Topic Group [facility] 3 ACUTE BEHAV [facility] [place], [address] [facility] GROUP DOCUMENTATION GROUP Group Therapy Note Attendees: 6 Attendance: Did not attend Patient's Goal: Interventions/techniques Marc Rubalcava (E4) spontaneous

## 2025-03-30 NOTE — ED STATDOCS - PROGRESS NOTE DETAILS
19-year-old female with a past medical history of a fatty liver, PSH of cholecystectomy in 2023 presents with mom for right upper quadrant pain for the last few days.  Patient states that she first started noticing it while at work when she was walking and has been pretty constant since then.  Today pain got worse and was seen at urgent care and was told to come to the emergency room for possible repeat retained gallstone.  Pain is worse with movement but not associated with oral intake.  Denies fever, vomiting, diarrhea.  Mild TTP to the right upper quadrant.  Negative Bose's.  Vitals are stable and patient is afebrile.  Labs, sono, UA and reeval. -Wili Brito PA-C Reevaluated pt. Pt feelign comfortable at this time. Labs including lfts and lipase are unremarkable. Sono shows a fatty liver which pt is aware but ascencio snot show a retained stone. Informed pt that pain could be from the fatty liver vs muscular and that she needs to f/u with her PMD. Pt and mom aware and agree with plan. -Wili Brito PA-C

## 2025-03-30 NOTE — ED STATDOCS - OBJECTIVE STATEMENT
18 y/o F with PMHx of asthma, cholecystectomy 2 years ago presents to the ED c/o R upper ad pain for several days. No nausea, vomiting, pain with eating. Reports pain with walking. No SOB or chest pain. No pain medication taken PTA. No associated sx. Seen at  [facility] and told to come to the ED.

## 2025-03-30 NOTE — ED STATDOCS - PATIENT PORTAL LINK FT
You can access the FollowMyHealth Patient Portal offered by [facility] by registering at the following website: [email or web address]. By joining [employer]’s FollowMyHealth portal, you will also be able to view your health information using other applications (apps) compatible with our system.

## 2025-03-30 NOTE — ED STATDOCS - ATTENDING APP SHARED VISIT CONTRIBUTION OF CARE
I,Patrick Damon MD,  performed the initial face to face bedside interview with this patient regarding history of present illness, review of symptoms and relevant past medical, social and family history.  I completed an independent physical examination.  I was the initial provider who evaluated this patient. I have signed out the follow up of any pending tests (i.e. labs, radiological studies) to the ACP.  I have communicated the patient’s plan of care and disposition with the ACP.  The history, relevant review of systems, past medical and surgical history, medical decision making, and physical examination was documented by the scribe in my presence and I attest to the accuracy of the documentation.

## 2025-03-30 NOTE — ED STATDOCS - NSICDXPASTMEDICALHX_GEN_ALL_CORE_FT
PAST MEDICAL HISTORY:  Asthma     Asthma, unspecified asthma severity, unspecified whether complicated, unspecified whether persistent

## 2025-03-30 NOTE — ED STATDOCS - CLINICAL SUMMARY MEDICAL DECISION MAKING FREE TEXT BOX
18 y/o F with RUQ pain, no associated sx. Tolerating PO. Pt was told in the past she had a fatty liver. Will obtain labs, urine, US.

## 2025-03-30 NOTE — ED ADULT TRIAGE NOTE - CHIEF COMPLAINT QUOTE
PT presents to er with complaints of ruq pain, emesis and diarrhea, denies fevers, sent in by urgent care for further evaluation at this time.

## 2025-03-30 NOTE — ED ADULT NURSE NOTE - OBJECTIVE STATEMENT
18 y/o female presents to ED c/o R upper abdominal pain that worsens with palpation and eating for multiple days. Denies chest pain, SOB, vomiting/diarrhea. no pain meds PTA. Seny in by UC. PMHx gallbladder removal x2 years ago.

## 2025-03-30 NOTE — ED STATDOCS - NSFOLLOWUPINSTRUCTIONS_ED_ALL_ED_FT
Nonalcoholic Fatty Liver Disease Diet, Adult  Nonalcoholic fatty liver disease is a condition that causes fat to build up in and around the liver. The disease makes it harder for the liver to work the way that it should.    Eating a healthy diet of fruits, vegetables, whole grains, lean proteins, and limiting added sugar and fats can help to keep nonalcoholic fatty liver disease under control. It can also help to prevent or improve conditions that are related to the disease, such as heart disease, diabetes, high blood pressure, obesity, and high cholesterol.    Along with regular exercise, this diet:  Promotes weight loss.  Helps to control blood sugar levels.  Helps to improve the way that the body uses insulin.  What are tips for following this plan?  Reading food labels    Always check food labels for:  The amount of saturated fat in a food. You should limit how much saturated fat you eat. Saturated fat is found in foods that come from animals, including meat and dairy products such as butter, cheese, and whole milk.  The amount of fiber in a food. You should choose high-fiber foods such as fruits, vegetables, and whole grains. Try to get 25–30 grams (g) of fiber a day.  Added sugar.  Avoid foods with a high amount of added sugar and high fructose corn syrup. Avoid sweetened soft drinks, sweetened tea, lemonade, sports drinks, and juices that are not 100% juice. Aim for foods with less than 5 grams of added sugar.  Every 4 grams of added sugar is 1 teaspoon (tsp) of sugar per serving.  Cooking    When cooking, use heart-healthy oils that are high in monounsaturated fats. These include olive oil, canola oil, and avocado oil.  Limit frying or deep-frying foods. Cook foods using healthy methods such as baking, boiling, steaming, and grilling instead.  Meal planning    You may want to keep track of how many calories you eat and drink. Eating the right amount of calories will help you achieve a healthy weight. Meeting with a dietitian can help you get started.  Limit how often you eat takeout and fast food. These foods are usually very high in fat, salt, and sugar.  Use the glycemic index (GI) to plan your meals. The index tells you how quickly a food will raise your blood sugar. Choose low-GI foods. Low-GI foods have a GI less than 55. These foods take a longer time to raise blood sugar. A dietitian can help you pick foods that are lower on the GI scale.  Try to include some meals each week that replace meat with beans or legumes.  Add fish 2–3 times a week, especially heart healthy oily fishes like salmon, sardines, trout, tuna, or mackerel.  Lifestyle    You may want to follow a Mediterranean diet. This diet includes a lot of vegetables, lean meats or fish, nuts and seeds, whole grains, fruits, and healthy oils and fats.  What foods can I eat?  A plate with examples of foods in a healthy diet.  Fruits    Apples. Bananas. Pears. Grapes. Papaya. Plums. Kiwi. Grapefruit. Cherries. Strawberries.    Vegetables    Lettuce. Spinach. Peas. Beets. Cauliflower. Cabbage. Broccoli. Carrots. Tomatoes. Squash. Eggplant. Herbs. Peppers. Onions. Cucumbers. [place] sprouts. Yams and sweet potatoes.    Grains    Whole wheat or whole-grain foods, including breads, crackers, cereals, and pasta. Stone-ground whole wheat. Unsweetened oatmeal. Bulgur. Barley. Quinoa. Brown or wild rice. Corn or whole wheat flour tortillas.    Meats and other proteins    Lean meats. Poultry. Tofu. Seafood and shellfish. Beans. Lentils.    Dairy    Low-fat or fat-free dairy products, such as yogurt, cottage cheese, or cheese.    Beverages    Water. Sugar-free drinks. Tea. Coffee. Low-fat or skim milk. Milk alternatives, such as unsweetened soy, oat, or almond milk. Real fruit juice.    Fats and oils    Avocado. Canola or olive oil. Nuts and nut butters. Seeds.    Seasonings and condiments    Mustard. Relish. Low-fat, low-sugar ketchup and barbecue sauce. Low-fat or fat-free mayonnaise.    Sweets and desserts    Sugar-free sweets.    The items listed above may not be all the foods and drinks you can have. Talk to a dietitian to learn more.    What foods should I limit or avoid?  Grains    White rice. Pasta. Breads.    Meats and other proteins    Limit red meat to 1–2 times a week.    Dairy    Full-fat dairy.    Fats and oils    Palm oil and coconut oil. Fried foods.    Other foods    Processed foods. Foods that contain a lot of salt (sodium) or added sugar.    Sweets and desserts    Sweets that contain sugar. Bakery items such as cookies, cakes, and other pastries.    Beverages    Sweetened drinks, such as sweet tea, milkshakes, iced sweet drinks, and sodas. Alcohol.    The items listed above may not be all the foods and drinks you should avoid. Talk to a dietitian to learn more.    Where to find more information  The National [place] of Diabetes and Digestive and Kidney Diseases: niddk.nih.gov

## 2025-03-30 NOTE — ED STATDOCS - NSICDXPASTSURGICALHX_GEN_ALL_CORE_FT
PAST SURGICAL HISTORY:  H/O adenoidectomy     H/O removal of neck cyst     History of tonsillectomy     History of tonsillectomy and adenoidectomy

## 2025-03-31 LAB
CULTURE RESULTS: SIGNIFICANT CHANGE UP
SPECIMEN SOURCE: SIGNIFICANT CHANGE UP

## 2025-05-21 ENCOUNTER — APPOINTMENT (OUTPATIENT)
Dept: PEDIATRIC ALLERGY IMMUNOLOGY | Facility: CLINIC | Age: 20
End: 2025-05-21

## 2025-06-02 ENCOUNTER — APPOINTMENT (OUTPATIENT)
Dept: PEDIATRIC ALLERGY IMMUNOLOGY | Facility: CLINIC | Age: 20
End: 2025-06-02
Payer: COMMERCIAL

## 2025-06-02 VITALS
DIASTOLIC BLOOD PRESSURE: 74 MMHG | TEMPERATURE: 98 F | HEART RATE: 72 BPM | SYSTOLIC BLOOD PRESSURE: 110 MMHG | OXYGEN SATURATION: 99 %

## 2025-06-02 DIAGNOSIS — Z91.018 ALLERGY TO OTHER FOODS: ICD-10-CM

## 2025-06-02 DIAGNOSIS — J30.9 ALLERGIC RHINITIS, UNSPECIFIED: ICD-10-CM

## 2025-06-02 DIAGNOSIS — Z91.010 ALLERGY TO PEANUTS: ICD-10-CM

## 2025-06-02 DIAGNOSIS — L50.8 OTHER URTICARIA: ICD-10-CM

## 2025-06-02 PROCEDURE — 99204 OFFICE O/P NEW MOD 45 MIN: CPT

## 2025-06-09 ENCOUNTER — LABORATORY RESULT (OUTPATIENT)
Age: 20
End: 2025-06-09

## 2025-06-10 ENCOUNTER — NON-APPOINTMENT (OUTPATIENT)
Age: 20
End: 2025-06-10

## 2025-06-12 ENCOUNTER — NON-APPOINTMENT (OUTPATIENT)
Age: 20
End: 2025-06-12

## 2025-06-15 ENCOUNTER — EMERGENCY (EMERGENCY)
Facility: HOSPITAL | Age: 20
LOS: 0 days | Discharge: HOME | End: 2025-06-16
Attending: STUDENT IN AN ORGANIZED HEALTH CARE EDUCATION/TRAINING PROGRAM
Payer: COMMERCIAL

## 2025-06-15 ENCOUNTER — NON-APPOINTMENT (OUTPATIENT)
Age: 20
End: 2025-06-15

## 2025-06-15 VITALS
HEART RATE: 86 BPM | WEIGHT: 260.81 LBS | HEIGHT: 65 IN | RESPIRATION RATE: 18 BRPM | DIASTOLIC BLOOD PRESSURE: 77 MMHG | TEMPERATURE: 98 F | SYSTOLIC BLOOD PRESSURE: 129 MMHG | OXYGEN SATURATION: 98 %

## 2025-06-15 DIAGNOSIS — Z98.890 OTHER SPECIFIED POSTPROCEDURAL STATES: Chronic | ICD-10-CM

## 2025-06-15 DIAGNOSIS — Z90.89 ACQUIRED ABSENCE OF OTHER ORGANS: Chronic | ICD-10-CM

## 2025-06-15 DIAGNOSIS — R42 DIZZINESS AND GIDDINESS: ICD-10-CM

## 2025-06-15 DIAGNOSIS — Z91.09 OTHER ALLERGY STATUS, OTHER THAN TO DRUGS AND BIOLOGICAL SUBSTANCES: ICD-10-CM

## 2025-06-15 DIAGNOSIS — Z86.69 PERSONAL HISTORY OF OTHER DISEASES OF THE NERVOUS SYSTEM AND SENSE ORGANS: ICD-10-CM

## 2025-06-15 DIAGNOSIS — R51.9 HEADACHE, UNSPECIFIED: ICD-10-CM

## 2025-06-15 DIAGNOSIS — Z91.018 ALLERGY TO OTHER FOODS: ICD-10-CM

## 2025-06-15 DIAGNOSIS — R11.0 NAUSEA: ICD-10-CM

## 2025-06-15 DIAGNOSIS — J45.909 UNSPECIFIED ASTHMA, UNCOMPLICATED: ICD-10-CM

## 2025-06-15 DIAGNOSIS — Z91.010 ALLERGY TO PEANUTS: ICD-10-CM

## 2025-06-15 LAB
APPEARANCE UR: CLEAR — SIGNIFICANT CHANGE UP
BACTERIA # UR AUTO: ABNORMAL /HPF
BASOPHILS # BLD AUTO: 0.07 K/UL — SIGNIFICANT CHANGE UP (ref 0–0.2)
BASOPHILS NFR BLD AUTO: 0.9 % — SIGNIFICANT CHANGE UP (ref 0–2)
BILIRUB UR-MCNC: NEGATIVE — SIGNIFICANT CHANGE UP
CAST: 0 /LPF — SIGNIFICANT CHANGE UP (ref 0–4)
COLOR SPEC: YELLOW — SIGNIFICANT CHANGE UP
DIFF PNL FLD: ABNORMAL
EOSINOPHIL # BLD AUTO: 0.23 K/UL — SIGNIFICANT CHANGE UP (ref 0–0.5)
EOSINOPHIL NFR BLD AUTO: 2.8 % — SIGNIFICANT CHANGE UP (ref 0–6)
GLUCOSE UR QL: NEGATIVE MG/DL — SIGNIFICANT CHANGE UP
HCT VFR BLD CALC: 38.2 % — SIGNIFICANT CHANGE UP (ref 34.5–45)
HGB BLD-MCNC: 12.8 G/DL — SIGNIFICANT CHANGE UP (ref 11.5–15.5)
IMM GRANULOCYTES # BLD AUTO: 0.02 K/UL — SIGNIFICANT CHANGE UP (ref 0–0.07)
IMM GRANULOCYTES NFR BLD AUTO: 0.2 % — SIGNIFICANT CHANGE UP (ref 0–0.9)
KETONES UR QL: NEGATIVE MG/DL — SIGNIFICANT CHANGE UP
LEUKOCYTE ESTERASE UR-ACNC: ABNORMAL
LYMPHOCYTES # BLD AUTO: 3 K/UL — SIGNIFICANT CHANGE UP (ref 1–3.3)
LYMPHOCYTES NFR BLD AUTO: 36.5 % — SIGNIFICANT CHANGE UP (ref 13–44)
MCHC RBC-ENTMCNC: 30.3 PG — SIGNIFICANT CHANGE UP (ref 27–34)
MCHC RBC-ENTMCNC: 33.5 G/DL — SIGNIFICANT CHANGE UP (ref 32–36)
MCV RBC AUTO: 90.5 FL — SIGNIFICANT CHANGE UP (ref 80–100)
MONOCYTES # BLD AUTO: 0.77 K/UL — SIGNIFICANT CHANGE UP (ref 0–0.9)
MONOCYTES NFR BLD AUTO: 9.4 % — SIGNIFICANT CHANGE UP (ref 2–14)
NEUTROPHILS # BLD AUTO: 4.14 K/UL — SIGNIFICANT CHANGE UP (ref 1.8–7.4)
NEUTROPHILS NFR BLD AUTO: 50.2 % — SIGNIFICANT CHANGE UP (ref 43–77)
NITRITE UR-MCNC: NEGATIVE — SIGNIFICANT CHANGE UP
NRBC # BLD AUTO: 0 K/UL — SIGNIFICANT CHANGE UP (ref 0–0)
NRBC # FLD: 0 K/UL — SIGNIFICANT CHANGE UP (ref 0–0)
NRBC BLD AUTO-RTO: 0 /100 WBCS — SIGNIFICANT CHANGE UP (ref 0–0)
PH UR: 6 — SIGNIFICANT CHANGE UP (ref 5–8)
PLATELET # BLD AUTO: 283 K/UL — SIGNIFICANT CHANGE UP (ref 150–400)
PMV BLD: 10.3 FL — SIGNIFICANT CHANGE UP (ref 7–13)
PROT UR-MCNC: NEGATIVE MG/DL — SIGNIFICANT CHANGE UP
RBC # BLD: 4.22 M/UL — SIGNIFICANT CHANGE UP (ref 3.8–5.2)
RBC # FLD: 12.2 % — SIGNIFICANT CHANGE UP (ref 10.3–14.5)
RBC CASTS # UR COMP ASSIST: 4 /HPF — SIGNIFICANT CHANGE UP (ref 0–4)
SP GR SPEC: 1.01 — SIGNIFICANT CHANGE UP (ref 1–1.03)
SQUAMOUS # UR AUTO: 2 /HPF — SIGNIFICANT CHANGE UP (ref 0–5)
UROBILINOGEN FLD QL: 0.2 MG/DL — SIGNIFICANT CHANGE UP (ref 0.2–1)
WBC # BLD: 8.23 K/UL — SIGNIFICANT CHANGE UP (ref 3.8–10.5)
WBC # FLD AUTO: 8.23 K/UL — SIGNIFICANT CHANGE UP (ref 3.8–10.5)
WBC UR QL: 4 /HPF — SIGNIFICANT CHANGE UP (ref 0–5)

## 2025-06-15 PROCEDURE — 85025 COMPLETE CBC W/AUTO DIFF WBC: CPT

## 2025-06-15 PROCEDURE — 99285 EMERGENCY DEPT VISIT HI MDM: CPT

## 2025-06-15 PROCEDURE — 93010 ELECTROCARDIOGRAM REPORT: CPT

## 2025-06-15 PROCEDURE — 84100 ASSAY OF PHOSPHORUS: CPT

## 2025-06-15 PROCEDURE — 84443 ASSAY THYROID STIM HORMONE: CPT

## 2025-06-15 PROCEDURE — 99285 EMERGENCY DEPT VISIT HI MDM: CPT | Mod: 25

## 2025-06-15 PROCEDURE — 93005 ELECTROCARDIOGRAM TRACING: CPT

## 2025-06-15 PROCEDURE — 83735 ASSAY OF MAGNESIUM: CPT

## 2025-06-15 PROCEDURE — 71046 X-RAY EXAM CHEST 2 VIEWS: CPT | Mod: 26

## 2025-06-15 PROCEDURE — 84702 CHORIONIC GONADOTROPIN TEST: CPT

## 2025-06-15 PROCEDURE — 84484 ASSAY OF TROPONIN QUANT: CPT

## 2025-06-15 PROCEDURE — 80053 COMPREHEN METABOLIC PANEL: CPT

## 2025-06-15 PROCEDURE — 71046 X-RAY EXAM CHEST 2 VIEWS: CPT

## 2025-06-15 PROCEDURE — 81001 URINALYSIS AUTO W/SCOPE: CPT

## 2025-06-15 PROCEDURE — 36415 COLL VENOUS BLD VENIPUNCTURE: CPT

## 2025-06-15 RX ORDER — MECLIZINE HCL 12.5 MG
25 TABLET ORAL ONCE
Refills: 0 | Status: COMPLETED | OUTPATIENT
Start: 2025-06-15 | End: 2025-06-15

## 2025-06-15 RX ADMIN — Medication 25 MILLIGRAM(S): at 23:18

## 2025-06-15 NOTE — ED STATDOCS - NSFOLLOWUPINSTRUCTIONS_ED_ALL_ED_FT
Your labs and XRAY showed no acute abnormalities.  Please  your medication and take as prescribed.   You have been referred to ENT for a vertigo evaluation.  Please follow-up with your cardiologist.    Return to the Emergency Department for worsening or persistent symptoms, and/or ANY NEW OR CONCERNING SYMPTOMS. If you have issues obtaining follow up, please call: [phone] or [phone]  to obtain a doctor or specialist who takes your insurance in your area.      Please see attached handout.

## 2025-06-15 NOTE — ED ADULT TRIAGE NOTE - CHIEF COMPLAINT QUOTE
Pt presenting to the ED c/o dizziness and nausea starting Thursday night while at work. Pt endorses "that she was working and her HR was in the 150s-160s and never got seen". Pt denies any vomiting or fever. PMHx of migraines and asthma.

## 2025-06-15 NOTE — ED STATDOCS - PHYSICAL EXAMINATION
Constitutional: NAD, alert, verbal  HEENT: NCAT, EOMi, PERRL  Cardiac: RRR no MRG  Resp: clear, no wheezing or crackles  GI: ab soft ntnd, no r/g  MSK/Ext: no edema  Neuro: ELDER  Skin: No rashes

## 2025-06-15 NOTE — ED STATDOCS - CLINICAL SUMMARY MEDICAL DECISION MAKING FREE TEXT BOX
18 y/o F with PMHx of asthma, migraines presents to the ED c/o intermittent dizziness and nausea since Thursday. States the dizziness started overnight at work, she started to feel palpitations and checked her HR which was in the 140s-150s. Reports the dizziness is worse with moving her head. Endorses episode of lightheadedness today and HA. No recent runny nose, sore throat, fevers, hx of anemia, vaginal bleeding, diarrhea, chest pain, SOB. Pt has been eating and drinking normally. LMP 3-4 weeks ago. Notes 2-3 months ago she was having palpitations, wore a holter monitor that showed tachycardia and PVCs.     R/o arrhythmia, thyroid, anemia, ddx includes bppv. Plan EKG, CXR, cardiac monitor, labs, Meclizine. 20 y/o F with PMHx of asthma, migraines presents to the ED c/o intermittent dizziness and nausea since Thursday. States the dizziness started overnight at work, she started to feel palpitations and checked her HR which was in the 140s-150s (seen on watch). Reports the brief episodes of  dizziness w/ room-spinning sensation (<1min), is worse with moving her head. Endorses episode of lightheadedness today and HA. No recent runny nose, sore throat, fevers, hx of anemia, vaginal bleeding, diarrhea, chest pain, SOB. Pt has been eating and drinking normally. LMP 3-4 weeks ago. Notes 2-3 months ago she was having palpitations, wore a holter monitor that showed tachycardia and PVCs.     R/o arrhythmia, thyroid, anemia, ddx includes bppv. Plan EKG, CXR, cardiac monitor, labs, Meclizine.

## 2025-06-15 NOTE — ED STATDOCS - PATIENT PORTAL LINK FT
You can access the FollowMyHealth Patient Portal offered by [facility] by registering at the following website: [email or web address]. By joining [facility]’s FollowMyHealth portal, you will also be able to view your health information using other applications (apps) compatible with our system.

## 2025-06-15 NOTE — ED STATDOCS - PROGRESS NOTE DETAILS
labs, cxr, and ekg unremarkable. tele reviewed no arrhytmias. will dc with cardiogoy f/u and ent f/u for vertigo

## 2025-06-16 VITALS
OXYGEN SATURATION: 100 % | DIASTOLIC BLOOD PRESSURE: 56 MMHG | SYSTOLIC BLOOD PRESSURE: 111 MMHG | HEART RATE: 83 BPM | RESPIRATION RATE: 17 BRPM | TEMPERATURE: 98 F

## 2025-06-16 LAB
ALBUMIN SERPL ELPH-MCNC: 3.3 G/DL — SIGNIFICANT CHANGE UP (ref 3.3–5)
ALP SERPL-CCNC: 79 U/L — SIGNIFICANT CHANGE UP (ref 40–120)
ALT FLD-CCNC: 26 U/L — SIGNIFICANT CHANGE UP (ref 12–78)
ANION GAP SERPL CALC-SCNC: 6 MMOL/L — SIGNIFICANT CHANGE UP (ref 5–17)
AST SERPL-CCNC: 21 U/L — SIGNIFICANT CHANGE UP (ref 15–37)
BILIRUB SERPL-MCNC: 0.4 MG/DL — SIGNIFICANT CHANGE UP (ref 0.2–1.2)
BUN SERPL-MCNC: 14 MG/DL — SIGNIFICANT CHANGE UP (ref 7–23)
CALCIUM SERPL-MCNC: 9.3 MG/DL — SIGNIFICANT CHANGE UP (ref 8.5–10.1)
CHLORIDE SERPL-SCNC: 110 MMOL/L — HIGH (ref 96–108)
CO2 SERPL-SCNC: 21 MMOL/L — LOW (ref 22–31)
CREAT SERPL-MCNC: 0.74 MG/DL — SIGNIFICANT CHANGE UP (ref 0.5–1.3)
EGFR: 118 ML/MIN/1.73M2 — SIGNIFICANT CHANGE UP
EGFR: 118 ML/MIN/1.73M2 — SIGNIFICANT CHANGE UP
GLUCOSE SERPL-MCNC: 96 MG/DL — SIGNIFICANT CHANGE UP (ref 70–99)
HCG SERPL-ACNC: <1 MIU/ML — SIGNIFICANT CHANGE UP
MAGNESIUM SERPL-MCNC: 2.1 MG/DL — SIGNIFICANT CHANGE UP (ref 1.6–2.6)
PHOSPHATE SERPL-MCNC: 4.1 MG/DL — SIGNIFICANT CHANGE UP (ref 2.5–4.5)
POTASSIUM SERPL-MCNC: 4.5 MMOL/L — SIGNIFICANT CHANGE UP (ref 3.5–5.3)
POTASSIUM SERPL-SCNC: 4.5 MMOL/L — SIGNIFICANT CHANGE UP (ref 3.5–5.3)
PROT SERPL-MCNC: 7.8 GM/DL — SIGNIFICANT CHANGE UP (ref 6–8.3)
SODIUM SERPL-SCNC: 137 MMOL/L — SIGNIFICANT CHANGE UP (ref 135–145)
TROPONIN I, HIGH SENSITIVITY RESULT: <3 NG/L — SIGNIFICANT CHANGE UP
TSH SERPL-MCNC: 2.88 UU/ML — SIGNIFICANT CHANGE UP (ref 0.34–4.82)

## 2025-06-16 RX ORDER — DIAZEPAM 5 MG/1
5 TABLET ORAL ONCE
Refills: 0 | Status: DISCONTINUED | OUTPATIENT
Start: 2025-06-16 | End: 2025-06-16

## 2025-06-16 RX ORDER — MECLIZINE HCL 12.5 MG
1 TABLET ORAL
Qty: 30 | Refills: 0
Start: 2025-06-16 | End: 2025-06-29

## 2025-06-16 RX ADMIN — Medication 1000 MILLILITER(S): at 00:01

## 2025-06-16 RX ADMIN — DIAZEPAM 5 MILLIGRAM(S): 5 TABLET ORAL at 00:17

## 2025-08-18 ENCOUNTER — NON-APPOINTMENT (OUTPATIENT)
Age: 20
End: 2025-08-18

## 2025-08-19 ENCOUNTER — APPOINTMENT (OUTPATIENT)
Dept: ENDOCRINOLOGY | Facility: CLINIC | Age: 20
End: 2025-08-19